# Patient Record
Sex: FEMALE | Race: WHITE | Employment: FULL TIME | URBAN - METROPOLITAN AREA
[De-identification: names, ages, dates, MRNs, and addresses within clinical notes are randomized per-mention and may not be internally consistent; named-entity substitution may affect disease eponyms.]

---

## 2017-01-03 PROBLEM — L90.0 LICHEN SCLEROSUS: Status: ACTIVE | Noted: 2017-01-03

## 2017-01-03 PROBLEM — M85.80 OSTEOPENIA: Status: ACTIVE | Noted: 2017-01-03

## 2017-01-06 ENCOUNTER — HOSPITAL ENCOUNTER (OUTPATIENT)
Dept: CT IMAGING | Facility: HOSPITAL | Age: 52
Discharge: HOME | End: 2017-01-06
Attending: FAMILY MEDICINE

## 2017-01-06 DIAGNOSIS — Z13.9 SCREENING: ICD-10-CM

## 2017-01-27 PROCEDURE — 36415 COLL VENOUS BLD VENIPUNCTURE: CPT | Performed by: FAMILY MEDICINE

## 2017-01-27 PROCEDURE — 80061 LIPID PANEL: CPT | Performed by: FAMILY MEDICINE

## 2017-01-27 PROCEDURE — 81003 URINALYSIS AUTO W/O SCOPE: CPT | Performed by: FAMILY MEDICINE

## 2017-01-27 PROCEDURE — 82306 VITAMIN D 25 HYDROXY: CPT | Performed by: FAMILY MEDICINE

## 2017-01-27 PROCEDURE — 80050 GENERAL HEALTH PANEL: CPT | Performed by: FAMILY MEDICINE

## 2017-02-02 PROBLEM — H66.92 LEFT CHRONIC OTITIS MEDIA: Status: ACTIVE | Noted: 2017-02-02

## 2017-02-02 PROBLEM — H61.22 LEFT EAR IMPACTED CERUMEN: Status: ACTIVE | Noted: 2017-02-02

## 2017-08-04 PROBLEM — H69.83 EUSTACHIAN TUBE DYSFUNCTION, BILATERAL: Status: ACTIVE | Noted: 2017-08-04

## 2017-08-04 PROBLEM — H90.3 BILATERAL SENSORINEURAL HEARING LOSS: Status: ACTIVE | Noted: 2017-08-04

## 2017-08-04 PROBLEM — J34.89 NASAL SEPTAL PERFORATION: Status: ACTIVE | Noted: 2017-08-04

## 2018-02-20 PROBLEM — H74.12: Status: ACTIVE | Noted: 2017-02-02

## 2018-03-08 ENCOUNTER — HOSPITAL ENCOUNTER (OUTPATIENT)
Dept: CT IMAGING | Age: 53
Discharge: HOME | End: 2018-03-08
Attending: OTOLARYNGOLOGY
Payer: COMMERCIAL

## 2018-03-08 ENCOUNTER — HOSPITAL ENCOUNTER (OUTPATIENT)
Dept: MRI IMAGING | Age: 53
Discharge: HOME | End: 2018-03-08
Attending: OTOLARYNGOLOGY
Payer: COMMERCIAL

## 2018-03-08 DIAGNOSIS — G91.9 HYDROCEPHALUS (HCC): ICD-10-CM

## 2018-03-08 DIAGNOSIS — H66.92 LEFT CHRONIC OTITIS MEDIA: ICD-10-CM

## 2018-03-08 PROCEDURE — 70551 MRI BRAIN STEM W/O DYE: CPT | Performed by: OTOLARYNGOLOGY

## 2018-03-08 PROCEDURE — 70480 CT ORBIT/EAR/FOSSA W/O DYE: CPT | Performed by: OTOLARYNGOLOGY

## 2018-03-08 NOTE — PROGRESS NOTES
Please let Jesusdomingo Rebeka know that her CT scan shows some mild inflammation of the left ear drum and middle ear, but the hearing bones do not have any abnormalities noted.  The mastoid area of the ears shows chronic inflammation on the left side and some mild f

## 2018-03-08 NOTE — PROGRESS NOTES
Phone call with patient. Patient verbalized understanding of all results and recommendations and agreed. Order placed for MRI.

## 2018-03-09 NOTE — PROGRESS NOTES
Phone call with patient. Patient verbalized understanding of all results and recommendations. Patient did see message on Fullscreen. Pt has an upcoming appt with neuro on 3/26/18.

## 2018-03-09 NOTE — PROGRESS NOTES
Dr. Leonardo Linton-- [facility] temporal bone had an abnormal finding of enlarged ventricles, concerning for hydrocephalus.  I ordered a stat MRI which does not show rob hydrocephalus but does show enlarged ventricles so I have referred her to neurology for fu

## 2018-03-09 NOTE — PROGRESS NOTES
Marcus Whitney, please call her to confirm she has received this result. Elmo Simmons. Your MRI show that the ventricles of the brain are larger than usual, but not rob hydrocephalus (a condition with too much fluid on the brain).  I recommend you see a neurolog

## 2018-10-26 ENCOUNTER — OFFICE VISIT (OUTPATIENT)
Dept: FAMILY MEDICINE CLINIC | Facility: CLINIC | Age: 53
End: 2018-10-26
Payer: COMMERCIAL

## 2018-10-26 VITALS
HEART RATE: 68 BPM | WEIGHT: 134.38 LBS | RESPIRATION RATE: 16 BRPM | TEMPERATURE: 98 F | OXYGEN SATURATION: 99 % | DIASTOLIC BLOOD PRESSURE: 82 MMHG | BODY MASS INDEX: 21.09 KG/M2 | SYSTOLIC BLOOD PRESSURE: 116 MMHG | HEIGHT: 67 IN

## 2018-10-26 DIAGNOSIS — S70.212A ABRASION OF HIP WITH INFECTION, LEFT, INITIAL ENCOUNTER: Primary | ICD-10-CM

## 2018-10-26 DIAGNOSIS — L08.9 ABRASION OF HIP WITH INFECTION, LEFT, INITIAL ENCOUNTER: Primary | ICD-10-CM

## 2018-10-26 PROCEDURE — 99213 OFFICE O/P EST LOW 20 MIN: CPT | Performed by: NURSE PRACTITIONER

## 2018-10-26 NOTE — PATIENT INSTRUCTIONS
· Please use Bactroban ointment to the area twice daily for up to 7 days. May cover during day with non stick pad; leave open with cotton clothing covering area at night time. · Clean the area with soap and water daily. Do not scrub the area.    · Toss Denny

## 2018-10-26 NOTE — PROGRESS NOTES
HPI:    Patient ID: Luis Alberto Sanchez is a 47year old female. Patient presents with three week history of scrape to left hip. She was helping her son learn to ride his bike and she fell on the road, scraping her hip and her left forearm.  She was wear 4 cm long oblong open abrasion noted where marked, consistent with fall. Scant clear/yellow drainage from center of abrasion, surrounded by mild redness, no erythema noted. No streaking from wound noted. Non tender to touch around edges. Psychiatric:  Sh

## 2019-02-10 ENCOUNTER — HOSPITAL ENCOUNTER (EMERGENCY)
Age: 54
Discharge: HOME | End: 2019-02-10
Payer: COMMERCIAL

## 2019-02-10 VITALS
TEMPERATURE: 98 F | OXYGEN SATURATION: 100 % | HEART RATE: 81 BPM | WEIGHT: 134.5 LBS | BODY MASS INDEX: 21 KG/M2 | RESPIRATION RATE: 20 BRPM | SYSTOLIC BLOOD PRESSURE: 139 MMHG | DIASTOLIC BLOOD PRESSURE: 72 MMHG

## 2019-02-10 DIAGNOSIS — S61.217A LACERATION OF LEFT LITTLE FINGER WITHOUT FOREIGN BODY WITHOUT DAMAGE TO NAIL, INITIAL ENCOUNTER: Primary | ICD-10-CM

## 2019-02-10 PROCEDURE — 12001 RPR S/N/AX/GEN/TRNK 2.5CM/<: CPT

## 2019-02-10 PROCEDURE — 90471 IMMUNIZATION ADMIN: CPT

## 2019-02-10 PROCEDURE — 99283 EMERGENCY DEPT VISIT LOW MDM: CPT

## 2019-02-10 NOTE — ED INITIAL ASSESSMENT (HPI)
Pt states she was cutting sweet potatoes and has a laceration to left 5th digit. Bleeding controlled. + CMS noted.

## 2019-02-10 NOTE — ED PROVIDER NOTES
Patient Seen in: [facility] [place]    History   Patient presents with:  Laceration Abrasion (integumentary)    Stated Complaint: LAC TO FIFTH DIGIT LEFT HAND    HPI    CHIEF COMPLAINT: Left pinky finger laceration    HISTORY OF [place] Exploratory laparotomy with lysis of adhesions and en bloc resection of appendix, portion of cecum, and mass at [facility] - DR URI LALA   • SKIN SURGERY  12/30/15    MMS to R cut lip at [facility] with AB   • TONSILLECTOMY Bilateral 1973   • UPPER GI ENDOSCOPY,B Verbal consent was obtained from the patient. The 1 cm laceration on the left pinky finger was anesthetized in the usual fashion. The laceration was anesthetized with lidocaine locally. The wound was cleansed and irrigated copiously.   The wound was expl

## 2019-02-20 ENCOUNTER — OFFICE VISIT (OUTPATIENT)
Dept: FAMILY MEDICINE CLINIC | Facility: CLINIC | Age: 54
End: 2019-02-20
Payer: COMMERCIAL

## 2019-02-20 VITALS
SYSTOLIC BLOOD PRESSURE: 114 MMHG | RESPIRATION RATE: 18 BRPM | DIASTOLIC BLOOD PRESSURE: 80 MMHG | OXYGEN SATURATION: 100 % | HEART RATE: 70 BPM | TEMPERATURE: 98 F

## 2019-02-20 DIAGNOSIS — Z48.02 VISIT FOR SUTURE REMOVAL: Primary | ICD-10-CM

## 2019-02-20 PROCEDURE — 99024 POSTOP FOLLOW-UP VISIT: CPT | Performed by: NURSE PRACTITIONER

## 2019-03-03 ENCOUNTER — OFFICE VISIT (OUTPATIENT)
Dept: FAMILY MEDICINE CLINIC | Facility: CLINIC | Age: 54
End: 2019-03-03
Payer: COMMERCIAL

## 2019-03-03 VITALS
WEIGHT: 135 LBS | TEMPERATURE: 99 F | RESPIRATION RATE: 18 BRPM | BODY MASS INDEX: 20.46 KG/M2 | SYSTOLIC BLOOD PRESSURE: 112 MMHG | DIASTOLIC BLOOD PRESSURE: 78 MMHG | HEART RATE: 78 BPM | OXYGEN SATURATION: 99 % | HEIGHT: 68 IN

## 2019-03-03 DIAGNOSIS — H65.193 ACUTE EFFUSION OF BOTH MIDDLE EARS: Primary | ICD-10-CM

## 2019-03-03 PROCEDURE — 99213 OFFICE O/P EST LOW 20 MIN: CPT | Performed by: NURSE PRACTITIONER

## 2019-03-03 NOTE — PROGRESS NOTES
CHIEF COMPLAINT:   Patient presents with:  Ear Problem: right sided, pressure, feels \"muffled\" and itches - x1 week      HPI:   Mckay Caldera is a 47year old female who presents to clinic today with complaints of right ear pain.  Has had for 1  w denies headaches or dizziness    EXAM:   Pulse 78   Temp 98.7 °F (37.1 °C) (Oral)   Resp 18   Ht 68\"   Wt 135 lb   SpO2 99%   BMI 20.53 kg/m²   GENERAL: well developed, well nourished,in no apparent distress  SKIN: no rashes,no suspicious lesions  HEAD: a

## 2019-04-08 PROCEDURE — 81003 URINALYSIS AUTO W/O SCOPE: CPT | Performed by: FAMILY MEDICINE

## 2019-04-08 PROCEDURE — 84482 T3 REVERSE: CPT | Performed by: FAMILY MEDICINE

## 2019-08-12 ENCOUNTER — OFFICE VISIT (OUTPATIENT)
Dept: FAMILY MEDICINE CLINIC | Facility: CLINIC | Age: 54
End: 2019-08-12
Payer: COMMERCIAL

## 2019-08-12 VITALS
BODY MASS INDEX: 21.63 KG/M2 | HEIGHT: 67 IN | DIASTOLIC BLOOD PRESSURE: 80 MMHG | WEIGHT: 137.81 LBS | RESPIRATION RATE: 20 BRPM | OXYGEN SATURATION: 96 % | TEMPERATURE: 97 F | SYSTOLIC BLOOD PRESSURE: 120 MMHG | HEART RATE: 84 BPM

## 2019-08-12 DIAGNOSIS — H65.112 ACUTE MUCOID OTITIS MEDIA OF LEFT EAR: Primary | ICD-10-CM

## 2019-08-12 PROCEDURE — 99213 OFFICE O/P EST LOW 20 MIN: CPT | Performed by: NURSE PRACTITIONER

## 2019-08-12 RX ORDER — AMOXICILLIN 875 MG/1
875 TABLET, COATED ORAL 2 TIMES DAILY
Qty: 20 TABLET | Refills: 0 | Status: SHIPPED | OUTPATIENT
Start: 2019-08-12 | End: 2019-08-22

## 2019-08-12 RX ORDER — FLUTICASONE PROPIONATE 50 MCG
2 SPRAY, SUSPENSION (ML) NASAL DAILY
Qty: 3 BOTTLE | Refills: 3 | Status: SHIPPED | OUTPATIENT
Start: 2019-08-12 | End: 2020-08-06

## 2019-08-12 NOTE — PROGRESS NOTES
CHIEF COMPLAINT:   Patient presents with:  Ear Problem: ear pain      HPI:   Jose Wells is a 47year old female who presents to clinic today with complaints of left ear pain. Has had for 1  weeks. Pain is described as sharp.   Patient reports his GENERAL: Feeling well otherwise. SKIN: no unusual skin lesions or rashes  HEENT: See HPI  LUNGS: No cough, shortness of breath, or wheezing. CARDIOVASCULAR: No chest pain, palpitations  GI: No N/V/C/D.   NEURO: denies headaches or dizziness    EXAM:   B Risk and benefits of medication discussed. Stressed importance of completing full course of antibiotic. Tylenol/Motrin prn pain. Call or return if s/sx worsen, do not improve in 3 days, or if fever of 100.4 or greater persists for 72 hours.     Chucky Lunsford © 9044-3084 [employer] [address]. [address] [facility] [place] [place] [address] [place]. All rights reserved. This information is not intended as a substitute for professional medical care. Always follow your healthcare professional's instructions.               P

## 2021-04-30 PROBLEM — F32.0 MILD MAJOR DEPRESSION, SINGLE EPISODE (HCC): Status: ACTIVE | Noted: 2021-04-30

## 2021-04-30 PROBLEM — F41.1 GENERALIZED ANXIETY DISORDER: Status: ACTIVE | Noted: 2021-04-30

## 2021-11-29 PROBLEM — F32.0 MILD MAJOR DEPRESSION, SINGLE EPISODE (HCC): Status: RESOLVED | Noted: 2021-04-30 | Resolved: 2021-11-29

## 2021-11-29 PROBLEM — H74.12: Status: RESOLVED | Noted: 2017-02-02 | Resolved: 2021-11-29

## 2021-11-29 PROBLEM — F41.9 ANXIETY: Status: ACTIVE | Noted: 2020-07-07

## 2021-11-29 PROBLEM — H61.22 LEFT EAR IMPACTED CERUMEN: Status: RESOLVED | Noted: 2017-02-02 | Resolved: 2021-11-29

## 2022-12-26 NOTE — PROGRESS NOTES
Pt cut right pink finger 10 days ago and went to [facility] and had 5 sutures placed. Area was CDI. 5 sutures were removed with out an issues. Spoke pt's wife. Informed her of order requested has been sent to pharmacy. She verbalized understanding as confirmed by nurse Rodri.      Claire Rosado, MSN RN

## (undated) NOTE — ED AVS SNAPSHOT
Luis Alberto Sanchez   MRN: [id number]    Department:  [facility] [place]   Date of Visit:  2/10/2019           Disclosure     Insurance plans vary and the physician(s) referred by the ER may not be covered by your plan.  Please co tell this physician (or your personal doctor if your instructions are to return to your personal doctor) about any new or lasting problems. The primary care or specialist physician will see patients referred from the [facility] Emergency Department.  William Celestin